# Patient Record
Sex: FEMALE | Race: OTHER | HISPANIC OR LATINO
[De-identification: names, ages, dates, MRNs, and addresses within clinical notes are randomized per-mention and may not be internally consistent; named-entity substitution may affect disease eponyms.]

---

## 2018-10-15 PROBLEM — M25.561 CHRONIC PAIN OF BOTH KNEES: Status: ACTIVE | Noted: 2018-10-15

## 2018-10-16 ENCOUNTER — APPOINTMENT (OUTPATIENT)
Dept: ORTHOPEDIC SURGERY | Facility: CLINIC | Age: 63
End: 2018-10-16
Payer: COMMERCIAL

## 2018-10-16 VITALS
HEART RATE: 78 BPM | WEIGHT: 190 LBS | SYSTOLIC BLOOD PRESSURE: 149 MMHG | DIASTOLIC BLOOD PRESSURE: 77 MMHG | BODY MASS INDEX: 37.3 KG/M2 | HEIGHT: 60 IN

## 2018-10-16 DIAGNOSIS — G89.29 PAIN IN RIGHT KNEE: ICD-10-CM

## 2018-10-16 DIAGNOSIS — M25.561 PAIN IN RIGHT KNEE: ICD-10-CM

## 2018-10-16 DIAGNOSIS — M17.11 UNILATERAL PRIMARY OSTEOARTHRITIS, RIGHT KNEE: ICD-10-CM

## 2018-10-16 DIAGNOSIS — Z82.61 FAMILY HISTORY OF ARTHRITIS: ICD-10-CM

## 2018-10-16 DIAGNOSIS — M17.12 UNILATERAL PRIMARY OSTEOARTHRITIS, LEFT KNEE: ICD-10-CM

## 2018-10-16 DIAGNOSIS — Z80.9 FAMILY HISTORY OF MALIGNANT NEOPLASM, UNSPECIFIED: ICD-10-CM

## 2018-10-16 DIAGNOSIS — M25.562 PAIN IN RIGHT KNEE: ICD-10-CM

## 2018-10-16 PROCEDURE — 99406 BEHAV CHNG SMOKING 3-10 MIN: CPT

## 2018-10-16 PROCEDURE — 73564 X-RAY EXAM KNEE 4 OR MORE: CPT | Mod: 50

## 2018-10-16 PROCEDURE — 99204 OFFICE O/P NEW MOD 45 MIN: CPT | Mod: 25

## 2018-10-16 RX ORDER — CYCLOBENZAPRINE HYDROCHLORIDE 5 MG/1
5 TABLET, FILM COATED ORAL
Qty: 60 | Refills: 0 | Status: ACTIVE | COMMUNITY
Start: 2018-05-02

## 2018-10-16 RX ORDER — ENALAPRIL MALEATE AND HYDROCHLOROTHIAZIDE 10; 25 MG/1; MG/1
10-25 TABLET ORAL
Qty: 90 | Refills: 0 | Status: ACTIVE | COMMUNITY
Start: 2018-10-02

## 2018-10-16 RX ORDER — MONTELUKAST 10 MG/1
10 TABLET, FILM COATED ORAL
Qty: 30 | Refills: 0 | Status: ACTIVE | COMMUNITY
Start: 2018-10-05

## 2019-01-11 ENCOUNTER — APPOINTMENT (OUTPATIENT)
Dept: ORTHOPEDIC SURGERY | Facility: HOSPITAL | Age: 64
End: 2019-01-11

## 2021-06-07 ENCOUNTER — APPOINTMENT (OUTPATIENT)
Dept: POPULATION HEALTH | Facility: CLINIC | Age: 66
End: 2021-06-07
Payer: OTHER MISCELLANEOUS

## 2021-06-07 VITALS
BODY MASS INDEX: 40.64 KG/M2 | HEIGHT: 60 IN | TEMPERATURE: 97.5 F | OXYGEN SATURATION: 97 % | DIASTOLIC BLOOD PRESSURE: 78 MMHG | SYSTOLIC BLOOD PRESSURE: 120 MMHG | HEART RATE: 79 BPM | RESPIRATION RATE: 16 BRPM | WEIGHT: 207 LBS

## 2021-06-07 DIAGNOSIS — Z87.891 PERSONAL HISTORY OF NICOTINE DEPENDENCE: ICD-10-CM

## 2021-06-07 DIAGNOSIS — Z86.39 PERSONAL HISTORY OF OTHER ENDOCRINE, NUTRITIONAL AND METABOLIC DISEASE: ICD-10-CM

## 2021-06-07 DIAGNOSIS — Z82.49 FAMILY HISTORY OF ISCHEMIC HEART DISEASE AND OTHER DISEASES OF THE CIRCULATORY SYSTEM: ICD-10-CM

## 2021-06-07 DIAGNOSIS — Z80.7 FAMILY HISTORY OF OTHER MALIGNANT NEOPLASMS OF LYMPHOID, HEMATOPOIETIC AND RELATED TISSUES: ICD-10-CM

## 2021-06-07 DIAGNOSIS — Z83.0 FAMILY HISTORY OF HUMAN IMMUNODEFICIENCY VIRUS [HIV] DISEASE: ICD-10-CM

## 2021-06-07 DIAGNOSIS — F17.200 NICOTINE DEPENDENCE, UNSPECIFIED, UNCOMPLICATED: ICD-10-CM

## 2021-06-07 PROCEDURE — 99072 ADDL SUPL MATRL&STAF TM PHE: CPT

## 2021-06-07 PROCEDURE — 99203 OFFICE O/P NEW LOW 30 MIN: CPT

## 2021-06-07 RX ORDER — DICLOFENAC SODIUM 50 MG/1
50 TABLET, DELAYED RELEASE ORAL
Qty: 30 | Refills: 0 | Status: COMPLETED | COMMUNITY
Start: 2018-10-05 | End: 2021-06-07

## 2021-06-07 RX ORDER — CEPHALEXIN 500 MG/1
500 CAPSULE ORAL
Qty: 4 | Refills: 0 | Status: COMPLETED | COMMUNITY
Start: 2021-05-28

## 2021-06-07 RX ORDER — AMOXICILLIN AND CLAVULANATE POTASSIUM 875; 125 MG/1; MG/1
875-125 TABLET, COATED ORAL
Qty: 14 | Refills: 0 | Status: COMPLETED | COMMUNITY
Start: 2018-10-05 | End: 2021-06-07

## 2021-06-07 RX ORDER — CHOLECALCIFEROL (VITAMIN D3) 1250 MCG
1.25 MG CAPSULE ORAL
Qty: 12 | Refills: 0 | Status: ACTIVE | COMMUNITY
Start: 2020-09-22

## 2021-06-07 RX ORDER — AMOXICILLIN 500 MG/1
500 CAPSULE ORAL
Qty: 21 | Refills: 0 | Status: COMPLETED | COMMUNITY
Start: 2018-05-12 | End: 2021-06-07

## 2021-06-07 RX ORDER — MAGNESIUM OXIDE 241.3 MG/1000MG
400 TABLET ORAL
Qty: 90 | Refills: 0 | Status: COMPLETED | COMMUNITY
Start: 2020-09-22

## 2021-06-07 NOTE — ASSESSMENT
[Indicate if, in your opinion, the incident that the patient described was the competent medical cause of this injury/illness.] : The incident that the patient described was the competent medical cause of this injury/illness: Yes [Indicate if the patient's complaints are consistent with his/her history of the injury/illness.] : Indicate if the patient's complaints are consistent with his/her history of the injury/illness: Yes [Yes] : Yes, it is consistent [Can the patient return to usual work activities as indicated? If yes, indicate date___] : The patient cannot return to usual work activities as indicated. [Are there any work limitations? (If so, explain and quantify, including the anticipated duration of the limitations)] : There are work limitations. [FreeTextEntry1] : a. patient with known history of neck, shoulder, elbows and hand/wrist injuries, work related. 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 patient retired a week ago.  [FreeTextEntry5] : 100 [FreeTextEntry6] : clinical exam [FreeTextEntry7] : avoid exposure to inciting events.

## 2021-06-07 NOTE — HISTORY OF PRESENT ILLNESS
[FreeTextEntry1] : s. patient reports worsening neck pain especially in her left side for the past 2-3 months. pain is localized to the left side of the neck, sometimes radiating down the left upper extremity. she reports some worsening elbow and wrist pain as well, with numbness in her fingers, not constant. she reports some elbow and shoulder pain. she describes difficulty opening jars but not much difficulty lifting or carrying reasonable loads. \par patient has been diagnosed with neck, shoulders, elbows and wrist conditions. she had trigger finger at one time and she has had injections for carpal tunnel syndrome in the past. she has a right wrist splint but is damaged as it is an old one. her symptoms were generally stable for the past couple of years. her most recent wrist injections were in 2018 or so. 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 she works in static positions, fixed position, bending her neck to answer the phone at the same time that she is writing in the computer. work involves fingering and fine motion of the hands in repeated fashion. she had to drive from the [facility] to the [facility] to do supervision of different sites. work involves lifting and carrying objects. she has worked in this position for the past some 14 years. patient continued working during the covid epidemic, she was attending the office daily and doing computer and phone work. \par prior to working for the dol patient worked for [employer] in similar tasks overall. \par patient has a history of thyroid radiation for hyperthyroidism many years ago, she is currently on thyroid replacement therapy. she had bilateral knee replacement surgery, the last one around spt 2019. she also reports low back pain, is under chiropractic care. \par she has tried muscle relaxants for the neck pain with no good results.  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 her job includes mostly computer work, answering the phone, driving. she works in static positions, fixed position, bending her neck to answer the phone at the same time that she is writing in the computer. work involves fingering and fine motion of the hands in repeated fashion. she had to drive from the [facility] to the end [facility] to do supervision of different sites. work involves lifting and carrying objects.  [FreeTextEntry3] : neck movement, upper extremity activities, limited to carry/lift/pull and push; unable to do repeated tasks; difficulty opening jars; difficulty with fine movement in both hands.  [FreeTextEntry4] : hand injections, splints, physical therapy [Has the patient missed work because of the injury/illness?] : The patient has missed work because of the injury/illness. [No] : The patient is currently not working.

## 2021-06-07 NOTE — PHYSICAL EXAM
[General Appearance - Alert] : alert [General Appearance - In No Acute Distress] : in no acute distress [General Appearance - Well Nourished] : well nourished [General Appearance - Well Developed] : well developed [FreeTextEntry1] : some tenderness to palpation at paraspinal neck area, more left. spurling's and neck compression sign negative. limtation to neck rotatin to the left to some 45 degrees as compared to a good 75 to the right. tender to palpaiton at r a/c joint. full b shoulder abd with no painful arc, no drop test. impingement positive on r shoulder. no yaegerson's. tender at b lateral epicondyles. tennis elbow sign negative b but resutled in wrist pian on rigth. tinel negative at b elbows. tender to palpaiton at b forearms, extensor. no swelling of wrists. no thenar atrophy, tinel is negative b both at median and guyon's, but phalen's was positive on left at 15 sec and right at 20 sec. finkelstein postiive on rigth. no triggering of fingers noted. no sensory changes noted.  strengt was some 4+/5 b, thumb to index and thumb to pinky were 5/5 b.

## 2021-06-07 NOTE — PLAN
[FreeTextEntry1] : recommended a conservative approach at this time. will request physical therapy for her neck for some 8-10 weeks and occupational therapy for splinting for her hands 2 visits. mg-2 for these two treatments. also given home exercise program. will evaluate response to these treatments to decide further course. patient has stopped working. absence of exposure to inciting events should help speeding her recovery as well. patient should permanently avoid exposure to inciting events.  [FreeTextEntry2] : patient has retired from work [FreeTextEntry3] : guarded. chronic, permanent conditions.  [FreeTextEntry4] : 3 months.

## 2021-06-29 ENCOUNTER — APPOINTMENT (OUTPATIENT)
Dept: POPULATION HEALTH | Facility: CLINIC | Age: 66
End: 2021-06-29

## 2021-06-29 ENCOUNTER — APPOINTMENT (OUTPATIENT)
Dept: POPULATION HEALTH | Facility: CLINIC | Age: 66
End: 2021-06-29
Payer: OTHER MISCELLANEOUS

## 2021-06-29 VITALS
DIASTOLIC BLOOD PRESSURE: 70 MMHG | HEART RATE: 78 BPM | BODY MASS INDEX: 40.64 KG/M2 | TEMPERATURE: 97.8 F | HEIGHT: 60 IN | WEIGHT: 207 LBS | OXYGEN SATURATION: 97 % | SYSTOLIC BLOOD PRESSURE: 110 MMHG | RESPIRATION RATE: 16 BRPM

## 2021-06-29 DIAGNOSIS — M54.16 RADICULOPATHY, LUMBAR REGION: ICD-10-CM

## 2021-06-29 PROCEDURE — 99072 ADDL SUPL MATRL&STAF TM PHE: CPT

## 2021-06-29 PROCEDURE — 99203 OFFICE O/P NEW LOW 30 MIN: CPT

## 2021-06-29 NOTE — PLAN
[FreeTextEntry1] : p. i recommended patient to continue home exercise program on a regular, daily basis and to use OTC NSAIDs as needed for episodes of pain. patient may need limited courses of physical therapy and/or chiropractor treatment when presenting exacerbations, which are the natural course of her condition. will try to obtain medical records of her previous treatment. will request authorization for a mri of the lumbar spine to r/o other conditions. this serves as medical necessity for this test, will submit mg-1 or mg-2 according to carrier especifications. c4 completed, totally diasbeld.  [FreeTextEntry2] : permanetntly disabled.  [FreeTextEntry3] : guarded. her condition is chronic and persistent, and characterized by acute periods of exacerbations and progressive deterioration with time.  [FreeTextEntry4] : 2-3 months.

## 2021-06-29 NOTE — HISTORY OF PRESENT ILLNESS
[FreeTextEntry1] : s. patient reports lower back pain that she has been having since 2012. she was initially seen for this at a different location and recommended physical therapy. her symptoms initially improved temporarily, only to present further again in several occasions. in 2014 she was seen by a chiropractor as recommended by physical therapy for worsening pain. she was treated with temporal relief. her symptoms have since continued on and off. there are days the pain is severe, presenting after she has been standing doing her adls, such as washing her dishes, or after she has been sitting for a period of time. other days she has no pain at all. when presenting pain, patient describes lower back pain radiated to either one of her lower extremities, sometimes right, sometimes left; worse to the rigth. pain radiates from the back of the leg to the ankle on the right side and to mid shin on the left side. at some time patient presented weakness in her lower extremities but this subsided after she underwent knee replacement surgery in 2019. she describes no numbness or tingling in her lower extremities, mostly pain. 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 she had to drive from the [facility] to the [place] and sometimes [facility] to do outreach activities. this involved carrying chairs, tables, educational materials, all of which she had to lift, carry, pull and push at the time of the events. she worked in this position for the past some 14 years. \par prior to working for the dol patient worked for [employer] in similar tasks overall. \par patient has a history of other medical conditions, including other wire-related musculoskeletal conditions involving her neck and upper extremities.  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  [FreeTextEntry4] : physical therapy and chiropractic treatment, home exercises [FreeTextEntry5] : patient has additional musculoskeletal conditions affecting neck and upper extremities [Has the patient missed work because of the injury/illness?] : The patient has missed work because of the injury/illness. [No] : The patient is currently not working. [FreeTextEntry6] : june 2021

## 2021-06-29 NOTE — PHYSICAL EXAM
[General Appearance - Alert] : alert [General Appearance - In No Acute Distress] : in no acute distress [General Appearance - Well Nourished] : well nourished [General Appearance - Well Developed] : well developed [FreeTextEntry1] : increased lumbar lordosis. patient was able to climb to and from examining table. there is tenderness to palpation at right cva and paraspinal areas. some increased muscle spasm in paraspinal muscles. there is localized tenderness at the hips when pressing over trocanter areas b, more left. hip rotation resulted in bilateral hip pain radiated to the back, worse on the right side and worse to internal hip rotation. slr was positive for some pulling on her back at some 45 degrees, not radiated. there was good strength for opposed thigh elevation and knee extension. there were no sensory changes to superficial touch in l5 and s1. patellar reflexes were 1+ r and 0 left; ankle were 0 bialteral. pateint was able to stand to heels and toes.

## 2021-06-29 NOTE — ASSESSMENT
[Indicate if, in your opinion, the incident that the patient described was the competent medical cause of this injury/illness.] : The incident that the patient described was the competent medical cause of this injury/illness: Yes [Indicate if the patient's complaints are consistent with his/her history of the injury/illness.] : Indicate if the patient's complaints are consistent with his/her history of the injury/illness: Yes [Yes] : Yes, it is consistent [Can the patient return to usual work activities as indicated? If yes, indicate date___] : The patient cannot return to usual work activities as indicated. [Are there any work limitations? (If so, explain and quantify, including the anticipated duration of the limitations)] : There are work limitations. [FreeTextEntry1] : patient brings a copy of notice of decision from the b dated 5-3-21: work related injury to the back. decision deals with c8.1 forms. \par a. patient with history of long-standing low back pain that started sometime in 2012 and has continued with exacerbations and temporal improvement since then. she has been seen by several medical professionals and had physical therapy and chiropractor treatments with temporal improvement. 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 in addition, she had to drive long distances as part of her job, exposed to the vibration of the vehicle. after conducting patient's examinatin, it is my conclusion, with sufficient degree of medical certainty, that patient has chronic low back pain, possible lumbar degenerative disc disease and possible lumbar radiculopathy bilateral caused by exposure to repeated lifting, pulling, pushing, carrying loads in her work and to vibration in the course of her traveling at work. patient's condition is permanent, chronic and active at this time.  [FreeTextEntry5] : 100 [FreeTextEntry6] : clinical exam [FreeTextEntry7] : as above, patient should avoid lifting, carrying, pulling and pushing loads; she should avoid staying in the same position for prolonged periods of time.

## 2021-07-07 DIAGNOSIS — M47.816 SPONDYLOSIS W/OUT MYELOPATHY OR RADICULOPATHY, LUMBAR REGION: ICD-10-CM

## 2021-07-07 DIAGNOSIS — M54.5 LOW BACK PAIN: ICD-10-CM

## 2021-09-14 ENCOUNTER — APPOINTMENT (OUTPATIENT)
Dept: POPULATION HEALTH | Facility: CLINIC | Age: 66
End: 2021-09-14
Payer: OTHER MISCELLANEOUS

## 2021-09-14 VITALS
TEMPERATURE: 97.3 F | WEIGHT: 207 LBS | DIASTOLIC BLOOD PRESSURE: 76 MMHG | SYSTOLIC BLOOD PRESSURE: 100 MMHG | BODY MASS INDEX: 40.64 KG/M2 | HEART RATE: 76 BPM | OXYGEN SATURATION: 100 % | HEIGHT: 60 IN

## 2021-09-14 DIAGNOSIS — M54.2 CERVICALGIA: ICD-10-CM

## 2021-09-14 PROCEDURE — 99213 OFFICE O/P EST LOW 20 MIN: CPT

## 2021-09-14 PROCEDURE — 99072 ADDL SUPL MATRL&STAF TM PHE: CPT

## 2021-09-14 NOTE — PLAN
[FreeTextEntry1] : patient to attend occupational therapy for custom made splints as authorized. request authorization for ortho evaluation for wrist injections as per above. patient states these have helped in the past and would like to try them. this serves as medical necessity for this treatment. mg2 completed. continue home exercises. avoidance of exposure to inciting events. c4 completed.  [FreeTextEntry2] : permanently disabled [FreeTextEntry3] : guarded, chronic, permanent conditions.  [FreeTextEntry4] : 3 months.

## 2021-09-14 NOTE — PHYSICAL EXAM
[General Appearance - Alert] : alert [General Appearance - In No Acute Distress] : in no acute distress [General Appearance - Well Nourished] : well nourished [General Appearance - Well Developed] : well developed [FreeTextEntry1] : tender to palpatin at neck/paraspinal b with severe muscle spasm in traps areas b. spurling and neck compression were negative but pt reported pain on left side of the neck upon neck extension to the right. positive r shoulder impingement. some tenderness at b a/c joints. tender at b medial epicondyles. tennis elbow sign negative b. full pronation/supination b with some pain on b elbows medial epicondyles and b thumb areas. tender to palpation b flexor forearms. no swelling of wrists. tinel is negative in b wrists. no sensory chagnes to palpation.no thenar atorphy noted. finkelstein positive b especially left. good  strength, good index to thumb but index to pinky was some 4/5 especially rigth.

## 2021-09-14 NOTE — ASSESSMENT
[Indicate if, in your opinion, the incident that the patient described was the competent medical cause of this injury/illness.] : The incident that the patient described was the competent medical cause of this injury/illness: Yes [Indicate if the patient's complaints are consistent with his/her history of the injury/illness.] : Indicate if the patient's complaints are consistent with his/her history of the injury/illness: Yes [Yes] : Yes, it is consistent [Can the patient return to usual work activities as indicated? If yes, indicate date___] : The patient cannot return to usual work activities as indicated. [Are there any work limitations? (If so, explain and quantify, including the anticipated duration of the limitations)] : There are work limitations. [FreeTextEntry1] : patient continues with symptoms especially in both wrists. she states wrist injections have been very helpful in the past and would like to try them again. otherwise symptoms are stable with home exercise program and stopping working.  [FreeTextEntry5] : 100 [FreeTextEntry6] : clinical findings [FreeTextEntry7] : repeated tests, forceful motion

## 2021-09-14 NOTE — REASON FOR VISIT
[Follow-Up] : a [unfilled] follow-up visit  [FreeTextEntry2] : 11/1/2011 [FreeTextEntry1] : visit conducted in [place]

## 2021-09-14 NOTE — HISTORY OF PRESENT ILLNESS
[FreeTextEntry1] : patient reports that her symptoms have improved to some degree now that she has not been working and doing computer tasks. she, however, continues with numbness and tingling in both hands, especially at night and also upon waking up in the mornings. describes abnormal sensations just with touching especially her thumbs, left more than right. describes difficulty using her hands for forceful activities such as carrying, lifting and opening jars. continues with some elbow pain and neck pain especially the left side. \par patient did not undergo physical therapy as her symptoms decreased in severity with home exercises and with stopping work. \par she has not gotten custom made splints for her hands.\par wc: case accepted.  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  [FreeTextEntry5] : none [FreeTextEntry4] : hand injections, splints, physical therpay [Has the patient missed work because of the injury/illness?] : The patient has missed work because of the injury/illness. [No] : The patient is currently not working. [FreeTextEntry6] : june 2021

## 2021-09-28 ENCOUNTER — APPOINTMENT (OUTPATIENT)
Dept: POPULATION HEALTH | Facility: CLINIC | Age: 66
End: 2021-09-28

## 2022-01-11 ENCOUNTER — APPOINTMENT (OUTPATIENT)
Dept: POPULATION HEALTH | Facility: CLINIC | Age: 67
End: 2022-01-11

## 2022-09-08 ENCOUNTER — NON-APPOINTMENT (OUTPATIENT)
Age: 67
End: 2022-09-08

## 2022-09-08 ENCOUNTER — APPOINTMENT (OUTPATIENT)
Dept: OPHTHALMOLOGY | Facility: CLINIC | Age: 67
End: 2022-09-08

## 2022-09-08 PROCEDURE — 92004 COMPRE OPH EXAM NEW PT 1/>: CPT

## 2022-09-15 ENCOUNTER — NON-APPOINTMENT (OUTPATIENT)
Age: 67
End: 2022-09-15

## 2022-09-15 ENCOUNTER — APPOINTMENT (OUTPATIENT)
Dept: OPHTHALMOLOGY | Facility: CLINIC | Age: 67
End: 2022-09-15

## 2022-09-15 PROCEDURE — 99214 OFFICE O/P EST MOD 30 MIN: CPT

## 2022-09-15 PROCEDURE — 92285 EXTERNAL OCULAR PHOTOGRAPHY: CPT | Mod: E3

## 2022-10-06 ENCOUNTER — APPOINTMENT (OUTPATIENT)
Dept: OPHTHALMOLOGY | Facility: CLINIC | Age: 67
End: 2022-10-06

## 2022-10-06 ENCOUNTER — NON-APPOINTMENT (OUTPATIENT)
Age: 67
End: 2022-10-06

## 2022-10-06 PROCEDURE — 92285 EXTERNAL OCULAR PHOTOGRAPHY: CPT

## 2022-10-06 PROCEDURE — 99213 OFFICE O/P EST LOW 20 MIN: CPT

## 2022-11-03 ENCOUNTER — APPOINTMENT (OUTPATIENT)
Dept: OPHTHALMOLOGY | Facility: CLINIC | Age: 67
End: 2022-11-03

## 2023-10-16 ENCOUNTER — APPOINTMENT (OUTPATIENT)
Dept: ORTHOPEDIC SURGERY | Facility: CLINIC | Age: 68
End: 2023-10-16
Payer: OTHER MISCELLANEOUS

## 2023-10-16 DIAGNOSIS — M25.531 PAIN IN RIGHT WRIST: ICD-10-CM

## 2023-10-16 DIAGNOSIS — M79.641 PAIN IN RIGHT HAND: ICD-10-CM

## 2023-10-16 DIAGNOSIS — M79.642 PAIN IN RIGHT HAND: ICD-10-CM

## 2023-10-16 DIAGNOSIS — M25.532 PAIN IN RIGHT WRIST: ICD-10-CM

## 2023-10-16 PROCEDURE — 73110 X-RAY EXAM OF WRIST: CPT | Mod: 50

## 2023-10-16 PROCEDURE — 73130 X-RAY EXAM OF HAND: CPT | Mod: 50

## 2023-10-16 PROCEDURE — 99203 OFFICE O/P NEW LOW 30 MIN: CPT | Mod: 25

## 2023-10-16 PROCEDURE — 20605 DRAIN/INJ JOINT/BURSA W/O US: CPT | Mod: 50

## 2023-10-17 ENCOUNTER — APPOINTMENT (OUTPATIENT)
Age: 68
End: 2023-10-17
Payer: OTHER MISCELLANEOUS

## 2023-10-17 VITALS
BODY MASS INDEX: 38.48 KG/M2 | HEART RATE: 75 BPM | HEIGHT: 60 IN | RESPIRATION RATE: 18 BRPM | DIASTOLIC BLOOD PRESSURE: 78 MMHG | TEMPERATURE: 98.1 F | SYSTOLIC BLOOD PRESSURE: 118 MMHG | WEIGHT: 196 LBS | OXYGEN SATURATION: 98 %

## 2023-10-17 DIAGNOSIS — E03.9 HYPOTHYROIDISM, UNSPECIFIED: ICD-10-CM

## 2023-10-17 PROCEDURE — 99213 OFFICE O/P EST LOW 20 MIN: CPT

## 2023-10-17 RX ORDER — LEVOTHYROXINE SODIUM 0.14 MG/1
137 TABLET ORAL DAILY
Qty: 90 | Refills: 3 | Status: ACTIVE | COMMUNITY
Start: 2023-10-17 | End: 1900-01-01

## 2023-10-17 RX ORDER — NABUMETONE 500 MG/1
500 TABLET, FILM COATED ORAL
Qty: 15 | Refills: 7 | Status: ACTIVE | COMMUNITY
Start: 2023-10-17 | End: 1900-01-01

## 2023-11-03 ENCOUNTER — NON-APPOINTMENT (OUTPATIENT)
Age: 68
End: 2023-11-03

## 2023-11-06 PROBLEM — G56.02 CARPAL TUNNEL SYNDROME OF LEFT WRIST: Status: ACTIVE | Noted: 2023-10-16

## 2023-11-06 PROBLEM — G56.01 CARPAL TUNNEL SYNDROME OF RIGHT WRIST: Status: ACTIVE | Noted: 2023-10-16

## 2023-11-06 PROBLEM — M18.12 PRIMARY OSTEOARTHRITIS OF FIRST CARPOMETACARPAL JOINT OF LEFT HAND: Status: ACTIVE | Noted: 2023-10-16

## 2023-11-13 ENCOUNTER — APPOINTMENT (OUTPATIENT)
Dept: ORTHOPEDIC SURGERY | Facility: CLINIC | Age: 68
End: 2023-11-13
Payer: OTHER MISCELLANEOUS

## 2023-11-13 DIAGNOSIS — G56.01 CARPAL TUNNEL SYNDROME, RIGHT UPPER LIMB: ICD-10-CM

## 2023-11-13 DIAGNOSIS — M18.12 UNILATERAL PRIMARY OSTEOARTHRITIS OF FIRST CARPOMETACARPAL JOINT, LEFT HAND: ICD-10-CM

## 2023-11-13 DIAGNOSIS — M18.11 UNILATERAL PRIMARY OSTEOARTHRITIS OF FIRST CARPOMETACARPAL JOINT, RIGHT HAND: ICD-10-CM

## 2023-11-13 DIAGNOSIS — G56.02 CARPAL TUNNEL SYNDROME, LEFT UPPER LIMB: ICD-10-CM

## 2023-11-13 PROCEDURE — 99214 OFFICE O/P EST MOD 30 MIN: CPT

## 2023-11-17 ENCOUNTER — APPOINTMENT (OUTPATIENT)
Age: 68
End: 2023-11-17

## 2024-03-07 ENCOUNTER — APPOINTMENT (OUTPATIENT)
Age: 69
End: 2024-03-07
Payer: OTHER MISCELLANEOUS

## 2024-03-07 DIAGNOSIS — M50.90 CERVICAL DISC DISORDER, UNSPECIFIED, UNSPECIFIED CERVICAL REGION: ICD-10-CM

## 2024-03-07 DIAGNOSIS — G56.03 CARPAL TUNNEL SYNDROM,BILATERAL UPPER LIMBS: ICD-10-CM

## 2024-03-07 DIAGNOSIS — M77.8 OTHER ENTHESOPATHIES, NOT ELSEWHERE CLASSIFIED: ICD-10-CM

## 2024-03-07 DIAGNOSIS — M77.12 LATERAL EPICONDYLITIS, RIGHT ELBOW: ICD-10-CM

## 2024-03-07 DIAGNOSIS — M19.049 PRIMARY OSTEOARTHRITIS, UNSPECIFIED HAND: ICD-10-CM

## 2024-03-07 DIAGNOSIS — M75.40 IMPINGEMENT SYNDROME OF UNSPECIFIED SHOULDER: ICD-10-CM

## 2024-03-07 DIAGNOSIS — M77.11 LATERAL EPICONDYLITIS, RIGHT ELBOW: ICD-10-CM

## 2024-03-07 PROCEDURE — 99441: CPT

## 2024-03-07 RX ORDER — DICLOFENAC SODIUM 1% 10 MG/G
1 GEL TOPICAL
Qty: 1 | Refills: 6 | Status: ACTIVE | COMMUNITY
Start: 2024-03-07 | End: 1900-01-01

## 2024-03-07 NOTE — ASSESSMENT
[Indicate if the patient's complaints are consistent with his/her history of the injury/illness.] : Indicate if the patient's complaints are consistent with his/her history of the injury/illness: Yes [Indicate if, in your opinion, the incident that the patient described was the competent medical cause of this injury/illness.] : The incident that the patient described was the competent medical cause of this injury/illness: Yes [Yes] : Yes, it is consistent [Are there any work limitations? (If so, explain and quantify, including the anticipated duration of the limitations)] : There are work limitations. [Can the patient return to usual work activities as indicated? If yes, indicate date___] : The patient cannot return to usual work activities as indicated. 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 s/p injection to b wrists.  currently better after injections. patient's conditions are permanent, she may experience flare ups of her conditions in the future.  [FreeTextEntry5] : 100 [FreeTextEntry6] : clinical findings [FreeTextEntry7] : repeated tests, forceful motion

## 2024-03-07 NOTE — PLAN
[FreeTextEntry1] : continue as per hand surgeon. given diclofenac gel local. continue home exercises. avoidance of exposure to inciting events. patient is permanently disabled. will see her prn.  [FreeTextEntry2] : permanently disabled [FreeTextEntry3] : guarded, chronic, permanent conditions.  [FreeTextEntry4] : prn

## 2024-03-07 NOTE — REASON FOR VISIT
[Follow-Up] : a [unfilled] follow-up visit  [FreeTextEntry2] : 11/1/2011 [FreeTextEntry1] : visit conducted in [place]

## 2024-03-07 NOTE — HISTORY OF PRESENT ILLNESS
[Home] : at home, [unfilled] , at the time of the visit. [Medical Office: ([facility])___] : at the medical office located in  [Verbal consent obtained from patient] : the patient, [unfilled] [Has the patient missed work because of the injury/illness?] : The patient has missed work because of the injury/illness. [No] : The patient is currently not working. [FreeTextEntry1] : patient could not attend in person due to recently been diagnosed with covid in the past couple of days, currently recovering.  patient reports much improvement of her hand pain after the injections. her right hand is much better, on the left she still notices pain and swelling especially at the cmc joints of the thumb and pinky fingers, but less than before. she still notices hand pain only occasionallu and mostly after repeated/constant hand activity for some time. on occasions pain still radiates to the arm and neck.  numbness and tingling in both hands is better as well. continues with difficulty using her hands for forceful activities such as carrying, lifting and opening jars.   patient has been doing home exercises. she uses wrist splints.  pt is not working.  wc: case accepted.  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  [FreeTextEntry4] : hand injections, splints, physical therpay [FreeTextEntry5] : none [FreeTextEntry6] : june 2021

## 2024-03-07 NOTE — PHYSICAL EXAM
[General Appearance - Alert] : alert [General Appearance - In No Acute Distress] : in no acute distress [FreeTextEntry1] : over the telephone patient sounded in no difficulty breathing, she was alert and oriented x 3, her responses were adequate.

## 2024-05-28 ENCOUNTER — TRANSCRIPTION ENCOUNTER (OUTPATIENT)
Age: 69
End: 2024-05-28